# Patient Record
Sex: MALE | Race: AMERICAN INDIAN OR ALASKA NATIVE
[De-identification: names, ages, dates, MRNs, and addresses within clinical notes are randomized per-mention and may not be internally consistent; named-entity substitution may affect disease eponyms.]

---

## 2021-07-17 ENCOUNTER — HOSPITAL ENCOUNTER (EMERGENCY)
Dept: HOSPITAL 5 - ED | Age: 36
LOS: 1 days | Discharge: LEFT BEFORE DISCHARGE | End: 2021-07-18
Payer: MEDICARE

## 2021-07-17 VITALS — DIASTOLIC BLOOD PRESSURE: 81 MMHG | SYSTOLIC BLOOD PRESSURE: 113 MMHG

## 2021-07-17 DIAGNOSIS — R53.1: Primary | ICD-10-CM

## 2021-07-17 DIAGNOSIS — Z53.21: ICD-10-CM

## 2021-07-17 PROCEDURE — 80053 COMPREHEN METABOLIC PANEL: CPT

## 2021-07-17 PROCEDURE — 36415 COLL VENOUS BLD VENIPUNCTURE: CPT

## 2021-07-17 PROCEDURE — 85025 COMPLETE CBC W/AUTO DIFF WBC: CPT

## 2021-07-18 LAB
ALBUMIN SERPL-MCNC: 4.4 G/DL (ref 3.9–5)
ALT SERPL-CCNC: 18 UNITS/L (ref 7–56)
BASOPHILS # (AUTO): 0 K/MM3 (ref 0–0.1)
BASOPHILS NFR BLD AUTO: 0.4 % (ref 0–1.8)
BUN SERPL-MCNC: 14 MG/DL (ref 9–20)
BUN/CREAT SERPL: 14 %
CALCIUM SERPL-MCNC: 9.1 MG/DL (ref 8.4–10.2)
EOSINOPHIL # BLD AUTO: 0 K/MM3 (ref 0–0.4)
EOSINOPHIL NFR BLD AUTO: 0.1 % (ref 0–4.3)
HCT VFR BLD CALC: 44.5 % (ref 35.5–45.6)
HEMOLYSIS INDEX: 10
HGB BLD-MCNC: 15.2 GM/DL (ref 11.8–15.2)
LYMPHOCYTES # BLD AUTO: 0.4 K/MM3 (ref 1.2–5.4)
LYMPHOCYTES NFR BLD AUTO: 11.1 % (ref 13.4–35)
MCHC RBC AUTO-ENTMCNC: 34 % (ref 32–34)
MCV RBC AUTO: 89 FL (ref 84–94)
MONOCYTES # (AUTO): 0.3 K/MM3 (ref 0–0.8)
MONOCYTES % (AUTO): 8.8 % (ref 0–7.3)
PLATELET # BLD: 240 K/MM3 (ref 140–440)
RBC # BLD AUTO: 5.02 M/MM3 (ref 3.65–5.03)

## 2021-07-18 NOTE — EVENT NOTE
ED Screening Note


Date of service: 07/18/21


Time: 00:33


ED Screening Note: 


Patient is a 36-year-old -American male with a history of myasthenia 

gravis who presents to the ED with complaint of acute onset persistent 

intractable nausea and vomiting, abdominal pain, diffuse body aches and pains, 

persistent dry cough, headache, and generalized weakness for the last 1 week, 

worse in the last 2 days.  Patient states that he usually gets IV infusion 

medications for myasthenia gravis if his symptoms get worse.  Patient states 

that he was expecting to do home infusions but has just been kicked out of the 

house by his girlfriend about 1 hour ago despite his symptoms get worse.  

Patient denies dizziness, syncope, diarrhea, chest pain, shortness of breath, 

fever, chills, seizures, change in vision, palpitations or hematemesis.





This initial assessment/diagnostic orders/clinical plan/treatment(s) is/are 

subject to change based on patients health status, clinical progression and re-

assessment by fellow clinical providers in the ED. Further treatment and workup 

at subsequent clinical providers discretion. Patient/guardian urged not to elope

from the ED as their condition may be serious if not clinically assessed and 

managed. 





Initial orders include: 


CBC, CMP, UA

## 2021-07-22 ENCOUNTER — HOSPITAL ENCOUNTER (EMERGENCY)
Dept: HOSPITAL 5 - ED | Age: 36
Discharge: LEFT BEFORE DISCHARGE | End: 2021-07-22
Payer: MEDICARE

## 2021-07-22 VITALS — DIASTOLIC BLOOD PRESSURE: 89 MMHG | SYSTOLIC BLOOD PRESSURE: 127 MMHG

## 2021-07-22 DIAGNOSIS — R53.1: ICD-10-CM

## 2021-07-22 DIAGNOSIS — R42: Primary | ICD-10-CM

## 2021-07-22 DIAGNOSIS — Z53.21: ICD-10-CM

## 2021-12-30 ENCOUNTER — HOSPITAL ENCOUNTER (EMERGENCY)
Dept: HOSPITAL 5 - ED | Age: 36
LOS: 1 days | Discharge: HOME | End: 2021-12-31
Payer: MEDICARE

## 2021-12-30 VITALS — SYSTOLIC BLOOD PRESSURE: 143 MMHG | DIASTOLIC BLOOD PRESSURE: 90 MMHG

## 2021-12-30 DIAGNOSIS — M62.81: Primary | ICD-10-CM

## 2021-12-30 DIAGNOSIS — Z59.00: ICD-10-CM

## 2021-12-30 PROCEDURE — 99282 EMERGENCY DEPT VISIT SF MDM: CPT

## 2021-12-30 SDOH — ECONOMIC STABILITY - HOUSING INSECURITY: HOMELESSNESS UNSPECIFIED: Z59.00

## 2021-12-31 NOTE — EMERGENCY DEPARTMENT REPORT
Chief Complaint: Medical Clearance


Stated Complaint: MUSCLE WEAKNESS


Time Seen by Provider: 12/31/21 00:06





- HPI


History of Present Illness: 





Patient is a 36-year-old F American male who is currently homeless who states he

wants to be transferred from our hospital to [place] to get an infusion for his 

myasthenia gravis.  According to the patient and at least one note in the past 

the patient has a remote history of myasthenia gravis.  Patient states he has 

been trying to get to [place] for the last 2 months.  Patient is not endorsing any

weakness of his eyelids or any inability to walk.  Patient was brought in by 

police for loitering in disturbing behavior.  Patient is not endorsing any 

homicidal suicidal ideations at this time.  Patient is adamant that he we need 

to transfer port him to [place] so he can get further care.





- ROS


Review of Systems: 





All systems are negative except for those in HPI





- Exam


Vital Signs: 


                                   Vital Signs











  12/30/21





  23:57


 


Temperature 98.9 F


 


Pulse Rate 73


 


Respiratory 16





Rate 


 


Blood Pressure 143/90


 


O2 Sat by Pulse 97





Oximetry 











Physical Exam: 





Patient talking loudly with pressured speech.  Patient is speaking to us and on 

the phone constantly.  Eyes extraocular movements are intact patient's eyelids 

opening closing within normal limits.  No facial droop.  Patient is lung exam no

respiratory distress.  Cardiac exam patient is regular rate and rhythm.  Abdomen

is nondistended.  Neurologically the patient is moving all extremities with 

normal strength.


MSE screening note: 


Focused history and physical exam performed.


Due to findings the following was ordered:











ED Medical Decision Making





- Medical Decision Making





Patient is a 36-year-old gentleman who is brought in by police.  Patient states 

he wants to be transferred to [place] so he can get an infusion for myasthenia 

gravis.  Patient does not appear to be in any crisis at this time.  He is 

breathing within normal limits moving all extremities with normal strength has 

no facial weakness.  Patient is speaking constantly while here.  Patient has no 

acute emergent condition.  Do believe the patient likely has some mental health 

issues and is also here for secondary gain secondary to his homelessness.  

Patient is stable for discharge





ED Disposition for MSE


Clinical Impression: 


 Homeless





Is pt being admited?: No


Does the pt Need Aspirin: No


Condition: Stable


Time of Disposition: 00:14